# Patient Record
Sex: FEMALE | Race: BLACK OR AFRICAN AMERICAN | Employment: UNEMPLOYED | URBAN - METROPOLITAN AREA
[De-identification: names, ages, dates, MRNs, and addresses within clinical notes are randomized per-mention and may not be internally consistent; named-entity substitution may affect disease eponyms.]

---

## 2024-09-04 ENCOUNTER — OFFICE VISIT (OUTPATIENT)
Age: 34
End: 2024-09-04

## 2024-09-04 VITALS
SYSTOLIC BLOOD PRESSURE: 118 MMHG | HEART RATE: 86 BPM | TEMPERATURE: 98.3 F | OXYGEN SATURATION: 98 % | WEIGHT: 190 LBS | DIASTOLIC BLOOD PRESSURE: 65 MMHG

## 2024-09-04 DIAGNOSIS — N89.8 VAGINAL DISCHARGE: Primary | ICD-10-CM

## 2024-09-04 DIAGNOSIS — Z20.2 POTENTIAL EXPOSURE TO STD: ICD-10-CM

## 2024-09-04 DIAGNOSIS — Z75.8 DOES NOT HAVE PRIMARY CARE PROVIDER: ICD-10-CM

## 2024-09-04 LAB
APPEARANCE FLUID: ABNORMAL
BILIRUBIN, POC: NEGATIVE
BLOOD URINE, POC: ABNORMAL
CLARITY, POC: ABNORMAL
COLOR, POC: YELLOW
GLUCOSE URINE, POC: NEGATIVE MG/DL
KETONES, POC: NEGATIVE MG/DL
LEUKOCYTE EST, POC: NEGATIVE
NITRITE, POC: NEGATIVE
PH, POC: 7
PROTEIN, POC: NEGATIVE MG/DL
SPECIFIC GRAVITY, POC: 1.02
UROBILINOGEN, POC: 0.2 MG/DL

## 2024-09-04 RX ORDER — FLUCONAZOLE 150 MG/1
150 TABLET ORAL ONCE
Qty: 1 TABLET | Refills: 0 | Status: SHIPPED | OUTPATIENT
Start: 2024-09-04 | End: 2024-09-04

## 2024-09-04 ASSESSMENT — ENCOUNTER SYMPTOMS
VOMITING: 0
ABDOMINAL PAIN: 0
DIARRHEA: 0
NAUSEA: 0

## 2024-09-04 NOTE — PROGRESS NOTES
Kris Robin (:  1990) is a 34 y.o. female, New patient, here for evaluation of the following chief complaint(s):  Exposure to STD    ASSESSMENT/PLAN:    ICD-10-CM    1. Vaginal discharge  N89.8 Vaginal Pathogens Probes *A     C.trachomatis N.gonorrhoeae DNA, Urine     fluconazole (DIFLUCAN) 150 MG tablet      2. Potential exposure to STD  Z20.2 Vaginal Pathogens Probes *A     POCT Urinalysis no Micro     C.trachomatis N.gonorrhoeae DNA, Urine      3. Does not have primary care provider  Z75.8 [facility] (No PCP)        POC testing: Discussed result(s) with patient  Results for POC orders placed in visit on 24   POCT Urinalysis no Micro   Result Value Ref Range    Color, UA yellow     Clarity, UA cloudy     Glucose, UA POC negative mg/dL    Bilirubin, UA negative     Ketones, UA negative mg/dL    Spec Grav, UA 1.025     Blood, UA POC trace     pH, UA 7.0     Protein, UA POC negative mg/dL    Urobilinogen, UA 0.2 mg/dL    Leukocytes, UA negative     Nitrite, UA negative     Appearance, Fluid Cloudy (A) Clear, Slightly Cloudy     Ddx includes: Gonorrhea, Chlamydia, Trichomonas, Bacterial vaginosis, Vaginal candidiasis   Disposition:  Pt is 35yo female here with c/o vaginal dc and itching. VSS. Physical Exam as below. Urinalysis is unremarkable. Urine and vaginal self swab sent for testing. Advised abstinence results pending. Will treat her empirically with diflucan. No PCP. Referral given.   Reviewed AVS with patient. All questions answered. If symptoms worsen go to the Emergency Department. Follow up in clinic or with PCP as needed. Patient is agreeable with plan.      SUBJECTIVE/OBJECTIVE:  35yo female here with c/o vaginal dc and itching x3dys. Denies any other systemci symptoms including fever, chills, n/v or urinary symptoms . Denies any known STI exposure. Reports her new partner cheated on her with another female. Denies care prior to arrival.

## 2024-09-04 NOTE — PATIENT INSTRUCTIONS
New Prescriptions    FLUCONAZOLE (DIFLUCAN) 150 MG TABLET    Take 1 tablet by mouth once for 1 dose     Will call you with your STI results  Abstain from sexual activities while results pending   Practice safe sex  Follow up with OBGYN in 1week or sooner for worsening symptoms

## 2024-09-05 ENCOUNTER — OFFICE VISIT (OUTPATIENT)
Age: 34
End: 2024-09-05

## 2024-09-05 VITALS — WEIGHT: 190 LBS

## 2024-09-05 DIAGNOSIS — A54.9 GONORRHEA: Primary | ICD-10-CM

## 2024-09-05 DIAGNOSIS — B96.89 BACTERIAL VAGINOSIS: Primary | ICD-10-CM

## 2024-09-05 DIAGNOSIS — N76.0 BACTERIAL VAGINOSIS: Primary | ICD-10-CM

## 2024-09-05 DIAGNOSIS — A74.9 CHLAMYDIA: ICD-10-CM

## 2024-09-05 LAB
C TRACH DNA UR QL NAA+PROBE: POSITIVE
CANDIDA DNA VAG QL NAA+PROBE: ABNORMAL
G VAGINALIS DNA SPEC QL NAA+PROBE: ABNORMAL
N GONORRHOEA DNA UR QL NAA+PROBE: POSITIVE
T VAGINALIS DNA VAG QL NAA+PROBE: ABNORMAL

## 2024-09-05 RX ORDER — METRONIDAZOLE 500 MG/1
500 TABLET ORAL 2 TIMES DAILY
Qty: 14 TABLET | Refills: 0 | Status: SHIPPED | OUTPATIENT
Start: 2024-09-05 | End: 2024-09-12

## 2024-09-05 RX ORDER — DOXYCYCLINE HYCLATE 100 MG
100 TABLET ORAL 2 TIMES DAILY
Qty: 14 TABLET | Refills: 0 | Status: SHIPPED | OUTPATIENT
Start: 2024-09-05 | End: 2024-09-12

## 2024-09-05 RX ORDER — CEFTRIAXONE 500 MG/1
500 INJECTION, POWDER, FOR SOLUTION INTRAMUSCULAR; INTRAVENOUS ONCE
Status: COMPLETED | OUTPATIENT
Start: 2024-09-05 | End: 2024-09-05

## 2024-09-05 RX ADMIN — CEFTRIAXONE 500 MG: 500 INJECTION, POWDER, FOR SOLUTION INTRAMUSCULAR; INTRAVENOUS at 19:11
